# Patient Record
Sex: MALE | Race: ASIAN | NOT HISPANIC OR LATINO | Employment: PART TIME
[De-identification: names, ages, dates, MRNs, and addresses within clinical notes are randomized per-mention and may not be internally consistent; named-entity substitution may affect disease eponyms.]

---
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
## 2024-02-15 ENCOUNTER — HOSPITAL ENCOUNTER (OUTPATIENT)
Dept: MRI IMAGING | Facility: CLINIC | Age: 19
Discharge: HOME | End: 2024-02-15
Attending: PSYCHIATRY & NEUROLOGY | Admitting: PSYCHIATRY & NEUROLOGY
Payer: MEDICAID

## 2024-02-15 DIAGNOSIS — E71.529 ALD (ADRENOLEUKODYSTROPHY) (H): ICD-10-CM

## 2024-02-15 DIAGNOSIS — H55.00 NYSTAGMUS: ICD-10-CM

## 2024-02-15 PROCEDURE — 255N000002 HC RX 255 OP 636: Performed by: PSYCHIATRY & NEUROLOGY

## 2024-02-15 PROCEDURE — A9585 GADOBUTROL INJECTION: HCPCS | Performed by: PSYCHIATRY & NEUROLOGY

## 2024-02-15 PROCEDURE — 70553 MRI BRAIN STEM W/O & W/DYE: CPT | Mod: 26 | Performed by: RADIOLOGY

## 2024-02-15 PROCEDURE — 70553 MRI BRAIN STEM W/O & W/DYE: CPT

## 2024-02-15 RX ORDER — GADOBUTROL 604.72 MG/ML
6.7 INJECTION INTRAVENOUS ONCE
Status: COMPLETED | OUTPATIENT
Start: 2024-02-15 | End: 2024-02-15

## 2024-02-15 RX ADMIN — GADOBUTROL 6.7 ML: 604.72 INJECTION INTRAVENOUS at 10:05

## 2024-02-15 NOTE — RESULT ENCOUNTER NOTE
These results contain minor abnormalities only that are consistent with Vasiliy's previous MRI brain.    I see from his recent ophthalmology notes, that the eye doctors were not worried about any nystagmus or other ocular abnormalities.  That is good news!    I look forward to seeing you for follow-up on February 19.    There is no change to the plan of care due to these results.  I will be happy to review the results in the patient's upcoming clinic visit. Please let me know if they have any additional questions.     Thanks!  Velma Quiroz MD

## 2024-02-19 ENCOUNTER — OFFICE VISIT (OUTPATIENT)
Dept: PEDIATRIC NEUROLOGY | Facility: CLINIC | Age: 19
End: 2024-02-19
Attending: PSYCHIATRY & NEUROLOGY
Payer: MEDICAID

## 2024-02-19 VITALS
DIASTOLIC BLOOD PRESSURE: 70 MMHG | SYSTOLIC BLOOD PRESSURE: 112 MMHG | HEIGHT: 69 IN | WEIGHT: 136.91 LBS | HEART RATE: 72 BPM | BODY MASS INDEX: 20.28 KG/M2

## 2024-02-19 DIAGNOSIS — R90.89 MRI OF BRAIN ABNORMAL: ICD-10-CM

## 2024-02-19 DIAGNOSIS — E71.529 ALD (ADRENOLEUKODYSTROPHY) (H): Primary | ICD-10-CM

## 2024-02-19 PROCEDURE — 99214 OFFICE O/P EST MOD 30 MIN: CPT | Performed by: PSYCHIATRY & NEUROLOGY

## 2024-02-19 ASSESSMENT — PAIN SCALES - GENERAL: PAINLEVEL: NO PAIN (0)

## 2024-02-19 NOTE — PROGRESS NOTES
Pediatric Neurology Progress Note    Patient name: Olayinka Medrano  Patient YOB: 2005  Medical record number: [id number]    Date of clinic visit: Feb 19, 2024    Chief complaint: ALD and abnormal MRI brain     Interval History:    Olayinka is here today in general neurology clinic accompanied by his   mother. I have also reviewed interim documentation from his care with ophthalmology and his MRI brain.     Since Olayinka was last seen in neurology clinic, he was seen by our colleagues in ophthalmology in September 2023.  There he was noted to have only end gaze nystagmus, but no pathological nystagmus.  His ophthalmologic exam was otherwise reassuring.  More recently he also had a repeat MRI with subtle, stable T2 hyperintensities only in the biparietal-occipital regions.    Since her last appointment he is noted no changes in his vision.  He has not noticed any nystagmus, nor has his family.  They have made some dietary changes and are now cooking only with grapeseed oil.    He continues to attend [employer].  He is studying computer graphics and visualization.  He is planning to transfer to [employer] and hopes to study videogame design.    Current Outpatient Medications   Medication Sig Dispense Refill    ALBUTEROL None Entered      azelastine (ASTELIN) 0.1 % nasal spray Spray 2 sprays into both nostrils 2 times daily 30 mL 1    azelastine (ASTELIN) 0.1 % nasal spray Spray 2 sprays into both nostrils 2 times daily 30 mL 3    cetirizine (ZYRTEC) 10 MG tablet Take 10 mg by mouth daily      fludrocortisone (FLORINEF) 0.1 MG tablet 0.05 mg (1/2 tab) daily by mouth 180 tablet 2    hydrocortisone (CORTEF) 5 MG tablet One in AM, 1/2 in afternoon, 1/2 in evening 180 tablet 3    multivitamin, therapeutic with minerals (MULTI-VITAMIN) TABS Take 1 tablet by mouth daily      VITAMIN D, CHOLECALCIFEROL, PO Take by mouth daily      XOPENEX 0.63 MG/3ML IN NEBU 0.63 mg in 3 ml 1 dose 0       No  "Known Allergies    Objective:     /70 (BP Location: Right arm, Patient Position: Sitting, Cuff Size: Adult Regular)   Pulse 72   Ht 1.74 m (5' 8.5\")   Wt 62.1 kg (136 lb 14.5 oz)   HC 54 cm (21.26\")   BMI 20.51 kg/m      Gen: The patient is awake and alert; comfortable and in no acute distress  Head: NC/AT  Eyes: PERRL, EOMI with spontaneous conjugate gaze  RESP: No increased work of breathing. Lungs clear to auscultation  CV: Regular rate and rhythm with no murmur  ABD: Soft non-tender, non-distended  Extremities: warm and well perfused without cyanosis or clubbing  Skin: No rash appreciated. No relevant birth marks    I completed a thorough neurological exam including:   This exam was notable for the following pertinent positives: Patient is awake and interactive. Language is age appropriate. PERRL. EOMI with spontaneous conjugate gaze. Face is symmetric. Tongue midline. Palate elevates symmetrically. Muscle tone, bulk, and strength are age appropriate. DTRs 2/2 throughout and symmetric. Toes mute. No clonus. Casual gait normal.     No nystagmus noted with EOM except end gaze nystagmus to the left     Cerebellar testing (FNF, LAMINE,and AFM) normal     Data Review:     Neuroimaging Review:     MRI brain [facility] 2/15/24:   IMPRESSION:  1.  Mild T2/FLAIR hyperintensities in the white matter posterior to  the bilateral occipital horns of the lateral ventricles, similar in  size from previous imaging.  2.  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 Unlikely that this pineal cyst is causing the patient's  nystagmus. Continued follow-up as necessary.     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      Instructions from Dr. Quiroz:   Schedule MRI brain for follow-up in 6 months   Dr. Quiroz has placed a referral for Olayinka to be seen at the [facility] at the [facility]   Return to clinic in 6 months after MRI brain     Velma Quiroz MD  Pediatric Neurology     30 minutes spent on the date of the encounter doing chart review, history and exam, documentation and further activities as noted above.     Disclaimer: This note consists of words and symbols derived from keyboarding and dictation using voice recognition software.  As a result, there may be errors that have gone undetected.  Please consider this when interpreting information found in this note.

## 2024-02-19 NOTE — NURSING NOTE
"[facility] [[id number]]  No chief complaint on file.    Initial /70 (BP Location: Right arm, Patient Position: Sitting, Cuff Size: Adult Regular)   Pulse 72   Ht 5' 8.5\" (174 cm)   Wt 136 lb 14.5 oz (62.1 kg)   BMI 20.51 kg/m   Estimated body mass index is 20.51 kg/m  as calculated from the following:    Height as of this encounter: 5' 8.5\" (174 cm).    Weight as of this encounter: 136 lb 14.5 oz (62.1 kg).  Medication Reconciliation: complete    Does the patient want a flu shot today? No          "

## 2024-02-19 NOTE — LETTER
2/19/2024      RE: Olayinka Medrano  [address] [place] [address]     Dear Colleague,    Thank you for the opportunity to participate in the care of your patient, Olayinka Medrano, at the [facility]. Please see a copy of my visit note below.    Pediatric Neurology Progress Note    Patient name: Olayinka Medrano  Patient YOB: 2005  Medical record number: [id number]    Date of clinic visit: Feb 19, 2024    Chief complaint: ALD and abnormal MRI brain     Interval History:    Olayinka is here today in general neurology clinic accompanied by his   mother. I have also reviewed interim documentation from his care with ophthalmology and his MRI brain.     Since Olayinka was last seen in neurology clinic, he was seen by our colleagues in ophthalmology in September 2023.  There he was noted to have only end gaze nystagmus, but no pathological nystagmus.  His ophthalmologic exam was otherwise reassuring.  More recently he also had a repeat MRI with subtle, stable T2 hyperintensities only in the biparietal-occipital regions.    Since her last appointment he is noted no changes in his vision.  He has not noticed any nystagmus, nor has his family.  They have made some dietary changes and are now cooking only with grapeseed oil.    He continues to attend [employer].  He is studying computer graphics and visualization.  He is planning to transfer to [employer] and hopes to study videogame design.    Current Outpatient Medications   Medication Sig Dispense Refill    ALBUTEROL None Entered      azelastine (ASTELIN) 0.1 % nasal spray Spray 2 sprays into both nostrils 2 times daily 30 mL 1    azelastine (ASTELIN) 0.1 % nasal spray Spray 2 sprays into both nostrils 2 times daily 30 mL 3    cetirizine (ZYRTEC) 10 MG tablet Take 10 mg by mouth daily      fludrocortisone (FLORINEF) 0.1 MG tablet 0.05  "mg (1/2 tab) daily by mouth 180 tablet 2    hydrocortisone (CORTEF) 5 MG tablet One in AM, 1/2 in afternoon, 1/2 in evening 180 tablet 3    multivitamin, therapeutic with minerals (MULTI-VITAMIN) TABS Take 1 tablet by mouth daily      VITAMIN D, CHOLECALCIFEROL, PO Take by mouth daily      XOPENEX 0.63 MG/3ML IN NEBU 0.63 mg in 3 ml 1 dose 0       No Known Allergies    Objective:     /70 (BP Location: Right arm, Patient Position: Sitting, Cuff Size: Adult Regular)   Pulse 72   Ht 1.74 m (5' 8.5\")   Wt 62.1 kg (136 lb 14.5 oz)   HC 54 cm (21.26\")   BMI 20.51 kg/m      Gen: The patient is awake and alert; comfortable and in no acute distress  Head: NC/AT  Eyes: PERRL, EOMI with spontaneous conjugate gaze  RESP: No increased work of breathing. Lungs clear to auscultation  CV: Regular rate and rhythm with no murmur  ABD: Soft non-tender, non-distended  Extremities: warm and well perfused without cyanosis or clubbing  Skin: No rash appreciated. No relevant birth marks    I completed a thorough neurological exam including:   This exam was notable for the following pertinent positives: Patient is awake and interactive. Language is age appropriate. PERRL. EOMI with spontaneous conjugate gaze. Face is symmetric. Tongue midline. Palate elevates symmetrically. Muscle tone, bulk, and strength are age appropriate. DTRs 2/2 throughout and symmetric. Toes mute. No clonus. Casual gait normal.     No nystagmus noted with EOM except end gaze nystagmus to the left     Cerebellar testing (FNF, LAMINE,and AFM) normal     Data Review:     Neuroimaging Review:     MRI brain [facility] 2/15/24:   IMPRESSION:  1.  Mild T2/FLAIR hyperintensities in the white matter posterior to  the bilateral occipital horns of the lateral ventricles, similar in  size from previous imaging.  2.  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 Unlikely that this " pineal cyst is causing the patient's  nystagmus. Continued follow-up as necessary.     Assessment and Plan:     Olayinka Medrano is a 18 year old male with the following relevant neurological history:     Adrenoleukodystrophy  [place]'s disease  Nystagmus  Stable T2 hyperintensities in the biparieto-occipital regions    He is followed by endocrinology at [facility].      Instructions from Dr. Quiroz:   Schedule MRI brain for follow-up in 6 months   Dr. Quiroz has placed a referral for Olayinka to be seen at the [facility]D Clinic at the [facility]   Return to clinic in 6 months after MRI brain     Velma Quiroz MD  Pediatric Neurology     30 minutes spent on the date of the encounter doing chart review, history and exam, documentation and further activities as noted above.     Disclaimer: This note consists of words and symbols derived from keyboarding and dictation using voice recognition software.  As a result, there may be errors that have gone undetected.  Please consider this when interpreting information found in this note.

## 2024-02-19 NOTE — PATIENT INSTRUCTIONS
[facility]   [facility]ty [facility] [place]      Pediatric Call Center Scheduling and Nurse Questions:  [phone]    After hours urgent matters that cannot wait until the next business day:  [phone].  Ask for the on-call pediatric doctor for the specialty you are calling for be paged.      Prescription Renewals:  Please call your pharmacy first.  Your pharmacy must fax requests to [phone].  Please allow 2-3 days for prescriptions to be authorized.    If your physician has ordered a CT or MRI, you may schedule this test by calling [facility] in [place] at [phone].    **If your child is having a sedated procedure, they will need a history and physical done at their Primary Care Provider within 30 days of the procedure.  If your child was seen by the ordering provider in our office within 30 days of the procedure, their visit summary will work for the H&P unless they inform you otherwise.  If you have any questions, please call the RN Care Coordinator.**     Instructions from Dr. Quiroz:   Schedule MRI brain for follow-up in 6 months   Dr. Quiroz has placed a referral for Olayinka to be seen at the ALD Clinic at the [facility]   Return to clinic in 6 months after MRI brain

## 2024-06-23 ENCOUNTER — HEALTH MAINTENANCE LETTER (OUTPATIENT)
Age: 19
End: 2024-06-23

## 2025-07-12 ENCOUNTER — HEALTH MAINTENANCE LETTER (OUTPATIENT)
Age: 20
End: 2025-07-12